# Patient Record
Sex: MALE | URBAN - METROPOLITAN AREA
[De-identification: names, ages, dates, MRNs, and addresses within clinical notes are randomized per-mention and may not be internally consistent; named-entity substitution may affect disease eponyms.]

---

## 2020-03-06 ENCOUNTER — APPOINTMENT (RX ONLY)
Dept: URBAN - METROPOLITAN AREA CLINIC 148 | Facility: CLINIC | Age: 45
Setting detail: DERMATOLOGY
End: 2020-03-06

## 2020-03-06 DIAGNOSIS — Z71.89 OTHER SPECIFIED COUNSELING: ICD-10-CM

## 2020-03-06 DIAGNOSIS — D17 BENIGN LIPOMATOUS NEOPLASM: ICD-10-CM

## 2020-03-06 PROBLEM — D48.5 NEOPLASM OF UNCERTAIN BEHAVIOR OF SKIN: Status: ACTIVE | Noted: 2020-03-06

## 2020-03-06 PROCEDURE — ? COUNSELING

## 2020-03-06 PROCEDURE — 99202 OFFICE O/P NEW SF 15 MIN: CPT

## 2020-03-06 NOTE — HPI: SKIN LESIONS
How Severe Is Your Skin Lesion?: mild
Have Your Skin Lesions Been Treated?: not been treated
Is This A New Presentation, Or A Follow-Up?: Growth
Additional History: Patient reports he was seen in the emergency room and advised that it was an enlarged gland. \\nHe reports he had a CT scan of his head performed due to a fall and also had a chest X-ray performed with no concerning findings. He reports the mass has been slow growing over the past year. He most recently started to notice pain over his left elbow. He denies any numbness or tingling of his L upper extremity otherwise and has full range of motion. \\n\\nHe is presenting for further evaluation and management.

## 2020-03-06 NOTE — PROCEDURE: COUNSELING
Patient Specific Counseling (Will Not Stick From Patient To Patient): \\nHe reports he had a CT scan of his head performed due to a fall and also had a chest X-ray performed with no concerning findings. He reports the mass has been slow growing over the past year. He most recently started to notice pain over his left elbow. He denies any numbness or tingling of his L upper extremity otherwise and has full range of motion. \\n\\nOn examination, there is a large mass over his L armpit. Difficult to assess mobility of mass due to its size. Therefore, I am unable to determine possibility of malignancy and recommend imaging. \\n\\nI discussed return to [facility], which patient declines. Patient referred to [facility], who has ultrasound available as he does not want to return to an Emergency Room for further imaging. I discussed with patient the importance of having ER documentation to assist in further assessment. He understood and agreed with the plan. \\n\\n1 month follow up for further evaluation and management with imaging results. I advised him that a referral would be placed pending UC examination and ultrasound findings, if they deemed it necessary.
Detail Level: Generalized
Detail Level: Detailed

## 2020-03-06 NOTE — PROCEDURE: MIPS QUALITY
Quality 226: Preventive Care And Screening: Tobacco Use: Screening And Cessation Intervention: Patient screened for tobacco use, is a smoker AND received Cessation Counseling
Quality 110: Preventive Care And Screening: Influenza Immunization: Influenza Immunization not Administered because Patient Refused.
Quality 431: Preventive Care And Screening: Unhealthy Alcohol Use - Screening: Patient screened for unhealthy alcohol use using a single question and scores less than 2 times per year
Quality 130: Documentation Of Current Medications In The Medical Record: Current Medications Documented
Detail Level: Detailed